# Patient Record
Sex: MALE | Race: WHITE | Employment: FULL TIME | URBAN - METROPOLITAN AREA
[De-identification: names, ages, dates, MRNs, and addresses within clinical notes are randomized per-mention and may not be internally consistent; named-entity substitution may affect disease eponyms.]

---
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
## 2020-01-05 LAB
HSV 1 GLYCOPROTEIN G AB, IGG: 27.3 IV
HSV 2 GLYCOPROTEIN G AB, IGG: 0.13 IV
HSV TYPE 1/2 COMBINED AB, IGG: >22.4 IV
HSV TYPE 1/2 COMBINED ABS, IGM: 0.99 IV

## 2020-01-20 ENCOUNTER — HOSPITAL ENCOUNTER (OUTPATIENT)
Age: 20
Discharge: HOME | End: 2020-01-20
Attending: EMERGENCY MEDICINE
Payer: COMMERCIAL

## 2020-01-20 VITALS
BODY MASS INDEX: 21.7 KG/M2 | HEART RATE: 69 BPM | RESPIRATION RATE: 18 BRPM | DIASTOLIC BLOOD PRESSURE: 75 MMHG | HEIGHT: 71 IN | TEMPERATURE: 98 F | OXYGEN SATURATION: 100 % | WEIGHT: 155 LBS | SYSTOLIC BLOOD PRESSURE: 122 MMHG

## 2020-01-20 DIAGNOSIS — S76.211A GROIN STRAIN, RIGHT, INITIAL ENCOUNTER: Primary | ICD-10-CM

## 2020-01-20 PROCEDURE — 99212 OFFICE O/P EST SF 10 MIN: CPT

## 2020-01-20 PROCEDURE — 99202 OFFICE O/P NEW SF 15 MIN: CPT

## 2020-01-20 NOTE — ED INITIAL ASSESSMENT (HPI)
Patient is here after lifting a patient that weighted approximately 250 pounds on Wednesday while at work. He has been having right lower abdominal pain since then. He states the pain is getting better but work wants him to have it checked out.

## 2020-01-20 NOTE — ED PROVIDER NOTES
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 Appearance: He is well-developed. He is not diaphoretic. HENT:      Head: Normocephalic and atraumatic.    Eyes:      Conjunctiva/sclera: Conjunctivae normal.      Pupils: Pupils are equal, appointment as soon as possible for a visit in 2 days  Call for next available appointment        Medications Prescribed:  There are no discharge medications for this patient.

## 2020-05-06 ENCOUNTER — TELEMEDICINE (OUTPATIENT)
Dept: INTEGRATIVE MEDICINE | Facility: CLINIC | Age: 20
End: 2020-05-06

## 2020-05-06 DIAGNOSIS — Q27.30 AVM (ARTERIOVENOUS MALFORMATION): ICD-10-CM

## 2020-05-06 DIAGNOSIS — M25.512 ACUTE PAIN OF LEFT SHOULDER: ICD-10-CM

## 2020-05-06 DIAGNOSIS — S49.92XA INJURY OF LEFT SHOULDER, INITIAL ENCOUNTER: Primary | ICD-10-CM

## 2020-05-06 DIAGNOSIS — I67.1 CEREBRAL ANEURYSM, NONRUPTURED: ICD-10-CM

## 2020-05-06 DIAGNOSIS — Z92.3 S/P RADIATION THERAPY: ICD-10-CM

## 2020-05-06 PROCEDURE — 99214 OFFICE O/P EST MOD 30 MIN: CPT | Performed by: FAMILY MEDICINE

## 2020-05-06 NOTE — PATIENT INSTRUCTIONS
I have complete kraig in the body's ability to heal and transform. The products and items listed below (the “Products”)  and their claims have not been evaluated by the Food and Drug Administration.  Dietary products are not intended to treat, prevent, m to purchase: www.[employer]  Directions:  apply to affected area twice daily  Why: Traumeel S relieves pain and swelling and reduces inflammation in the case of injuries, minor injuries (eg bruises, hematomas, bruises), trains on the muscles, ligaments and

## 2020-05-06 NOTE — PROGRESS NOTES
Nael Buckley is a 21year old male. 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    Has been seen for PT under w Food insecurity:        Worry: Not on file        Inability: Not on file      Transportation needs:        Medical: Not on file        Non-medical: Not on file    Tobacco Use      Smoking status: Never Smoker      Smokeless tobacco: Never Used    ONEOK Neurological: He is alert and oriented to person, place, and time. No cranial nerve deficit. Gait normal.   Skin: Skin is warm and dry. Psychiatric: Affect normal.       ASSESSMENT AND PLAN:       1.  Injury of left shoulder, initial encounter  - PHYSIATR The products and items listed below (the “Products”)  and their claims have not been evaluated by the Food and Drug Administration. Dietary products are not intended to treat, prevent, mitigate or cure disease.  Ultimately, you must draw your own conclusion Where to purchase: www.[employer]  Directions:  apply to affected area twice daily  Why: Traumeel S relieves pain and swelling and reduces inflammation in the case of injuries, minor injuries (eg bruises, hematomas, bruises), trains on the muscles, ligamen

## 2020-05-21 ENCOUNTER — HOSPITAL ENCOUNTER (OUTPATIENT)
Dept: CT IMAGING | Facility: HOSPITAL | Age: 20
Discharge: HOME | End: 2020-05-21
Attending: FAMILY MEDICINE
Payer: COMMERCIAL

## 2020-05-21 ENCOUNTER — HOSPITAL ENCOUNTER (OUTPATIENT)
Dept: MRI IMAGING | Facility: HOSPITAL | Age: 20
Discharge: HOME | End: 2020-05-21
Attending: FAMILY MEDICINE
Payer: COMMERCIAL

## 2020-05-21 ENCOUNTER — TELEPHONE (OUTPATIENT)
Dept: INTEGRATIVE MEDICINE | Facility: CLINIC | Age: 20
End: 2020-05-21

## 2020-05-21 DIAGNOSIS — Z92.3 S/P RADIATION THERAPY: ICD-10-CM

## 2020-05-21 DIAGNOSIS — S49.92XA INJURY OF LEFT SHOULDER, INITIAL ENCOUNTER: ICD-10-CM

## 2020-05-21 DIAGNOSIS — Q28.2: ICD-10-CM

## 2020-05-21 DIAGNOSIS — M25.512 ACUTE PAIN OF LEFT SHOULDER: ICD-10-CM

## 2020-05-21 DIAGNOSIS — Q27.30 AVM (ARTERIOVENOUS MALFORMATION): ICD-10-CM

## 2020-05-21 DIAGNOSIS — I67.1 CEREBRAL ANEURYSM, NONRUPTURED: ICD-10-CM

## 2020-05-21 PROCEDURE — 82565 ASSAY OF CREATININE: CPT

## 2020-05-21 PROCEDURE — 73221 MRI JOINT UPR EXTREM W/O DYE: CPT | Performed by: FAMILY MEDICINE

## 2020-05-21 PROCEDURE — 70496 CT ANGIOGRAPHY HEAD: CPT | Performed by: FAMILY MEDICINE

## 2020-05-21 PROCEDURE — 70498 CT ANGIOGRAPHY NECK: CPT | Performed by: FAMILY MEDICINE

## 2020-05-21 NOTE — TELEPHONE ENCOUNTER
EE CT department called saying questioning the order you have for patient.  Please call at [phone]

## 2020-07-16 ENCOUNTER — OFFICE VISIT (OUTPATIENT)
Dept: INTEGRATIVE MEDICINE | Facility: CLINIC | Age: 20
End: 2020-07-16

## 2020-07-16 VITALS
BODY MASS INDEX: 23.66 KG/M2 | SYSTOLIC BLOOD PRESSURE: 120 MMHG | WEIGHT: 169 LBS | HEART RATE: 78 BPM | DIASTOLIC BLOOD PRESSURE: 72 MMHG | TEMPERATURE: 98 F | HEIGHT: 71 IN

## 2020-07-16 DIAGNOSIS — S46.912S STRAIN OF LEFT SHOULDER, SEQUELA: Primary | ICD-10-CM

## 2020-07-16 PROCEDURE — 99213 OFFICE O/P EST LOW 20 MIN: CPT | Performed by: PHYSICIAN ASSISTANT

## 2020-07-16 NOTE — PROGRESS NOTES
Juventino Nelson is a 21year old male. Patient presents with: Follow - Up: return to work note and exam       HPI:   Patient presents for follow up on shoulder energy which occurred 3/20.  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 He exhibits no mass. There is no tenderness. There is no guarding. Musculoskeletal: Normal range of motion. General: No tenderness or edema. Lymphadenopathy:     He has no cervical adenopathy.    Neurological: He is alert and

## 2020-08-18 ENCOUNTER — TELEPHONE (OUTPATIENT)
Dept: INTEGRATIVE MEDICINE | Facility: CLINIC | Age: 20
End: 2020-08-18

## 2020-08-18 DIAGNOSIS — M26.12 JAW ASYMMETRY: Primary | ICD-10-CM

## 2020-08-18 NOTE — TELEPHONE ENCOUNTER
Father called and is asking for a recommendation for an oral surgeon close to [facility]. Would like Dr Radha Fallon recommendation.  Pt informed that Dr Yimi Kitchen is out of the office this week and message would be sent to [facility] [place]

## 2020-08-24 NOTE — TELEPHONE ENCOUNTER
Referral signed, however it doesn't look like the oral surgeons are covered under the insurance plan, so I don't think he actually needs a referral.

## 2020-08-24 NOTE — TELEPHONE ENCOUNTER
Pts dad asking for referral and not just recommendation for oral surgeon, RN has pended recommendation

## 2020-08-25 NOTE — TELEPHONE ENCOUNTER
LVM informing patient's father that the referral has been placed and is at the  for pick or give us a call back to see if he wants it to be faxed.

## 2020-08-26 ENCOUNTER — TELEPHONE (OUTPATIENT)
Dept: INTEGRATIVE MEDICINE | Facility: CLINIC | Age: 20
End: 2020-08-26

## 2020-08-26 DIAGNOSIS — M26.12 JAW ASYMMETRY: Primary | ICD-10-CM

## 2020-08-26 NOTE — TELEPHONE ENCOUNTER
RN has advised patient that oral surgeons are not covered typically under his insurance policy, and referral dept has reached out to say that they will try to re submit with an in network oral surgeon, RN has pended in network oral surgeon but there are no

## 2020-08-26 NOTE — TELEPHONE ENCOUNTER
I signed the order, however how do you know they are in network? This physician wasn't listed as in-network or anywhere in the list of physicians.

## 2020-08-27 ENCOUNTER — TELEPHONE (OUTPATIENT)
Dept: INTEGRATIVE MEDICINE | Facility: CLINIC | Age: 20
End: 2020-08-27

## 2020-10-15 ENCOUNTER — OFFICE VISIT (OUTPATIENT)
Dept: INTEGRATIVE MEDICINE | Facility: CLINIC | Age: 20
End: 2020-10-15

## 2020-10-15 ENCOUNTER — HOSPITAL ENCOUNTER (EMERGENCY)
Facility: HOSPITAL | Age: 20
Discharge: HOME | End: 2020-10-15
Attending: EMERGENCY MEDICINE
Payer: COMMERCIAL

## 2020-10-15 VITALS
OXYGEN SATURATION: 98 % | HEART RATE: 70 BPM | BODY MASS INDEX: 23.41 KG/M2 | HEIGHT: 71 IN | DIASTOLIC BLOOD PRESSURE: 68 MMHG | SYSTOLIC BLOOD PRESSURE: 134 MMHG | WEIGHT: 167.19 LBS

## 2020-10-15 VITALS
OXYGEN SATURATION: 98 % | SYSTOLIC BLOOD PRESSURE: 114 MMHG | DIASTOLIC BLOOD PRESSURE: 63 MMHG | TEMPERATURE: 98 F | WEIGHT: 167 LBS | HEART RATE: 56 BPM | BODY MASS INDEX: 23.38 KG/M2 | HEIGHT: 71 IN | RESPIRATION RATE: 18 BRPM

## 2020-10-15 DIAGNOSIS — S76.111A STRAIN OF RIGHT QUADRICEPS, INITIAL ENCOUNTER: ICD-10-CM

## 2020-10-15 DIAGNOSIS — I67.1 CEREBRAL ANEURYSM, NONRUPTURED: ICD-10-CM

## 2020-10-15 DIAGNOSIS — R55 SYNCOPE, NEAR: Primary | ICD-10-CM

## 2020-10-15 DIAGNOSIS — R55 PRE-SYNCOPE: Primary | ICD-10-CM

## 2020-10-15 PROCEDURE — 85025 COMPLETE CBC W/AUTO DIFF WBC: CPT | Performed by: EMERGENCY MEDICINE

## 2020-10-15 PROCEDURE — 85379 FIBRIN DEGRADATION QUANT: CPT | Performed by: EMERGENCY MEDICINE

## 2020-10-15 PROCEDURE — 99214 OFFICE O/P EST MOD 30 MIN: CPT | Performed by: FAMILY MEDICINE

## 2020-10-15 PROCEDURE — 93010 ELECTROCARDIOGRAM REPORT: CPT | Performed by: EMERGENCY MEDICINE

## 2020-10-15 PROCEDURE — 96360 HYDRATION IV INFUSION INIT: CPT

## 2020-10-15 PROCEDURE — 80048 BASIC METABOLIC PNL TOTAL CA: CPT | Performed by: EMERGENCY MEDICINE

## 2020-10-15 PROCEDURE — 3078F DIAST BP <80 MM HG: CPT | Performed by: FAMILY MEDICINE

## 2020-10-15 PROCEDURE — 99284 EMERGENCY DEPT VISIT MOD MDM: CPT

## 2020-10-15 PROCEDURE — 93005 ELECTROCARDIOGRAM TRACING: CPT

## 2020-10-15 PROCEDURE — 3075F SYST BP GE 130 - 139MM HG: CPT | Performed by: FAMILY MEDICINE

## 2020-10-15 PROCEDURE — 3008F BODY MASS INDEX DOCD: CPT | Performed by: FAMILY MEDICINE

## 2020-10-15 PROCEDURE — 84484 ASSAY OF TROPONIN QUANT: CPT | Performed by: EMERGENCY MEDICINE

## 2020-10-15 NOTE — PROGRESS NOTES
Zachariah Parker is a 21year old male. Patient presents with:   Injury: R thigh - was playing soccer, felt a pop - x1 week  Syncope: hx of brain anyesum (age 9-12 yrs old) - states that he feels like hes going to pass out but then comes back to focus - no Not on file    Social Needs      Financial resource strain: Not on file      Food insecurity        Worry: Not on file        Inability: Not on file      Transportation needs        Medical: Not on file        Non-medical: Not on file    Tobacco Use Conjunctivae and EOM are normal. No scleral icterus. Neck: Neck supple. No thyromegaly present. Cardiovascular: Normal rate and regular rhythm.    Pulmonary/Chest: Effort normal and breath sounds normal.   Musculoskeletal:         General: No deformity

## 2020-10-15 NOTE — ED PROVIDER NOTES
Patient Seen in: [facility] AND [facility] Emergency Department      History   Patient presents with:  Lightheadedness    Stated Complaint: lightheaded    HPI    59-year-old male with history of brain aneurysm, last imaging in March is normal per patient, here w HPI.  Constitutional and vital signs reviewed. All other systems reviewed and negative except as noted above.     Physical Exam     ED Triage Vitals [10/15/20 1738]   /89   Pulse 67   Resp 18   Temp 98.1 °F (36.7 °C)   Temp src Oral   SpO2 99 % Result Value Ref Range    Glucose 95 70 - 99 mg/dL    Sodium 139 136 - 145 mmol/L    Potassium 3.9 3.5 - 5.1 mmol/L    Chloride 105 98 - 112 mmol/L    CO2 30.0 21.0 - 32.0 mmol/L    Anion Gap 4 0 - 18 mmol/L    BUN 17 7 - 18 mg/dL    Creatinine 0.96 0.70 and found no leukocytosis, no anemia, electrolytes reassuring, troponin negative, dimer negative  - fluids ordered  - decreased episodes of lightheadedness - pt well appearing - discussed need for possible outpt holter monitoring  - no indication for brain this patient.

## 2020-10-15 NOTE — ED INITIAL ASSESSMENT (HPI)
Patient presents to ER from [address] [place] [address] with complaints of episodes of the sensation of passing out, light headedness throughout the day. Patient admits they are happening more frequently. Patient also admits to history of brain aneuysm at age 15.    Leyla

## 2020-10-16 ENCOUNTER — TELEPHONE (OUTPATIENT)
Dept: INTEGRATIVE MEDICINE | Facility: CLINIC | Age: 20
End: 2020-10-16

## 2020-10-16 NOTE — TELEPHONE ENCOUNTER
Patient went to ER  (felt like he was passing out) ER  inform him that his  doctor should  monitor his heart. # [phone]      FYSWAPNA, Patient is scheduled for appt on 10/20/20.

## 2020-10-22 ENCOUNTER — OFFICE VISIT (OUTPATIENT)
Dept: INTEGRATIVE MEDICINE | Facility: CLINIC | Age: 20
End: 2020-10-22

## 2020-10-22 VITALS
OXYGEN SATURATION: 98 % | DIASTOLIC BLOOD PRESSURE: 62 MMHG | SYSTOLIC BLOOD PRESSURE: 116 MMHG | HEART RATE: 58 BPM | WEIGHT: 165.38 LBS | BODY MASS INDEX: 23.15 KG/M2 | HEIGHT: 71 IN

## 2020-10-22 DIAGNOSIS — I67.1 CEREBRAL ANEURYSM, NONRUPTURED: ICD-10-CM

## 2020-10-22 DIAGNOSIS — Q27.30 AVM (ARTERIOVENOUS MALFORMATION): ICD-10-CM

## 2020-10-22 DIAGNOSIS — S76.111D STRAIN OF RIGHT QUADRICEPS, SUBSEQUENT ENCOUNTER: ICD-10-CM

## 2020-10-22 DIAGNOSIS — Q28.2: ICD-10-CM

## 2020-10-22 DIAGNOSIS — R55 PRE-SYNCOPE: Primary | ICD-10-CM

## 2020-10-22 PROCEDURE — 3074F SYST BP LT 130 MM HG: CPT | Performed by: FAMILY MEDICINE

## 2020-10-22 PROCEDURE — 90686 IIV4 VACC NO PRSV 0.5 ML IM: CPT | Performed by: FAMILY MEDICINE

## 2020-10-22 PROCEDURE — 99214 OFFICE O/P EST MOD 30 MIN: CPT | Performed by: FAMILY MEDICINE

## 2020-10-22 PROCEDURE — 3078F DIAST BP <80 MM HG: CPT | Performed by: FAMILY MEDICINE

## 2020-10-22 PROCEDURE — 90471 IMMUNIZATION ADMIN: CPT | Performed by: FAMILY MEDICINE

## 2020-10-22 PROCEDURE — 3008F BODY MASS INDEX DOCD: CPT | Performed by: FAMILY MEDICINE

## 2020-11-04 ENCOUNTER — HOSPITAL ENCOUNTER (OUTPATIENT)
Dept: CV DIAGNOSTICS | Facility: HOSPITAL | Age: 20
Discharge: HOME | End: 2020-11-04
Attending: FAMILY MEDICINE
Payer: COMMERCIAL

## 2020-11-04 ENCOUNTER — HOSPITAL ENCOUNTER (OUTPATIENT)
Dept: CT IMAGING | Facility: HOSPITAL | Age: 20
Discharge: HOME | End: 2020-11-04
Attending: FAMILY MEDICINE
Payer: COMMERCIAL

## 2020-11-04 DIAGNOSIS — I67.1 CEREBRAL ANEURYSM, NONRUPTURED: ICD-10-CM

## 2020-11-04 DIAGNOSIS — Q28.2: ICD-10-CM

## 2020-11-04 DIAGNOSIS — R55 PRE-SYNCOPE: ICD-10-CM

## 2020-11-04 PROCEDURE — 70496 CT ANGIOGRAPHY HEAD: CPT | Performed by: FAMILY MEDICINE

## 2020-11-04 PROCEDURE — 93306 TTE W/DOPPLER COMPLETE: CPT | Performed by: FAMILY MEDICINE

## 2020-11-04 PROCEDURE — 70498 CT ANGIOGRAPHY NECK: CPT | Performed by: FAMILY MEDICINE

## 2020-11-10 ENCOUNTER — TELEPHONE (OUTPATIENT)
Dept: INTEGRATIVE MEDICINE | Facility: CLINIC | Age: 20
End: 2020-11-10

## 2020-11-10 DIAGNOSIS — M26.12 OTHER JAW ASYMMETRY: Primary | ICD-10-CM

## 2020-11-10 NOTE — TELEPHONE ENCOUNTER
Please fax referral for facial surgeon [facility]#[phone]  - at the [address] [place] [address] office now.

## 2020-11-10 NOTE — TELEPHONE ENCOUNTER
RN called the office of Dr Carlos Garner back, and was hung on on and called back was placed on hold and needed to handle triage. Pt was to be evaluated by PCP before referral could be processed.

## 2020-11-10 NOTE — TELEPHONE ENCOUNTER
There is no active referral in patient's chart.  It was cancelled in late August, as patient was to f/u with provider first.

## 2020-11-11 NOTE — TELEPHONE ENCOUNTER
[facility] message sent to patient informing him to follow up with Dr Rafiq Kuhn for further recommendations on oral surgery or referral.

## 2020-11-12 NOTE — TELEPHONE ENCOUNTER
Pt informed that referral has been placed - also scheduled appt for 12/15/20 to be evaluated for his concern regarding oral surgery

## 2020-11-12 NOTE — TELEPHONE ENCOUNTER
Referral for Dr. Carole Brownlee placed, however the HMO may require that he be seen in the office for evaluation of his concern prior to approval of the referral.

## 2020-12-19 ENCOUNTER — IMMUNIZATION (OUTPATIENT)
Dept: LAB | Facility: HOSPITAL | Age: 20
End: 2020-12-19
Attending: PREVENTIVE MEDICINE
Payer: COMMERCIAL

## 2020-12-19 DIAGNOSIS — Z23 NEED FOR VACCINATION: ICD-10-CM

## 2020-12-19 PROCEDURE — 0001A PFIZER-BIONTECH COVID-19 VACCINE: CPT

## 2021-01-09 ENCOUNTER — IMMUNIZATION (OUTPATIENT)
Dept: LAB | Facility: HOSPITAL | Age: 21
End: 2021-01-09
Attending: PREVENTIVE MEDICINE
Payer: COMMERCIAL

## 2021-01-09 DIAGNOSIS — Z23 NEED FOR VACCINATION: ICD-10-CM

## 2021-01-09 PROCEDURE — 0002A SARSCOV2 VAC 30MCG/0.3ML IM: CPT

## 2021-12-21 ENCOUNTER — HOSPITAL ENCOUNTER (OUTPATIENT)
Age: 21
Discharge: HOME | End: 2021-12-21
Payer: COMMERCIAL

## 2021-12-21 VITALS
HEART RATE: 65 BPM | TEMPERATURE: 97 F | SYSTOLIC BLOOD PRESSURE: 140 MMHG | DIASTOLIC BLOOD PRESSURE: 86 MMHG | OXYGEN SATURATION: 100 % | RESPIRATION RATE: 18 BRPM

## 2021-12-21 DIAGNOSIS — J02.9 ACUTE PHARYNGITIS, UNSPECIFIED ETIOLOGY: Primary | ICD-10-CM

## 2021-12-21 PROCEDURE — U0002 COVID-19 LAB TEST NON-CDC: HCPCS | Performed by: NURSE PRACTITIONER

## 2021-12-21 PROCEDURE — 99213 OFFICE O/P EST LOW 20 MIN: CPT | Performed by: NURSE PRACTITIONER

## 2021-12-21 PROCEDURE — 87880 STREP A ASSAY W/OPTIC: CPT | Performed by: NURSE PRACTITIONER

## 2021-12-21 RX ORDER — BENZONATATE 100 MG/1
100 CAPSULE ORAL 3 TIMES DAILY PRN
Qty: 20 CAPSULE | Refills: 0 | Status: SHIPPED | OUTPATIENT
Start: 2021-12-21 | End: 2021-12-28

## 2021-12-22 NOTE — ED PROVIDER NOTES
Patient Seen in: [facility]    History   CC: cough  HPI: Bianca Swannv 24year old male  who presents c/o cough, runny nose, congestion, sore throat and chills.   States he has had a cough for the last 1 week however in the last 24 hours symp injected, no discharge noted, no periorbital edema  ENT - EAC bilaterally without discharge, TM pearly grey with COL visualized appropriately bilaterally. no nasal drainage noted in nares bilat, no cobblestoning to post. Pharynx.    Oropharynx clear, post

## 2021-12-25 ENCOUNTER — HOSPITAL ENCOUNTER (OUTPATIENT)
Age: 21
Discharge: ANOTHER HOSPITAL | End: 2021-12-25
Attending: EMERGENCY MEDICINE
Payer: COMMERCIAL

## 2021-12-25 ENCOUNTER — APPOINTMENT (OUTPATIENT)
Dept: CT IMAGING | Facility: HOSPITAL | Age: 21
End: 2021-12-25
Attending: EMERGENCY MEDICINE
Payer: COMMERCIAL

## 2021-12-25 ENCOUNTER — HOSPITAL ENCOUNTER (EMERGENCY)
Facility: HOSPITAL | Age: 21
Discharge: HOME | End: 2021-12-25
Attending: EMERGENCY MEDICINE
Payer: COMMERCIAL

## 2021-12-25 VITALS
DIASTOLIC BLOOD PRESSURE: 90 MMHG | TEMPERATURE: 98 F | BODY MASS INDEX: 25.9 KG/M2 | HEART RATE: 69 BPM | HEIGHT: 71 IN | WEIGHT: 185 LBS | SYSTOLIC BLOOD PRESSURE: 141 MMHG | OXYGEN SATURATION: 100 % | RESPIRATION RATE: 14 BRPM

## 2021-12-25 VITALS
RESPIRATION RATE: 16 BRPM | SYSTOLIC BLOOD PRESSURE: 135 MMHG | WEIGHT: 185 LBS | HEART RATE: 76 BPM | DIASTOLIC BLOOD PRESSURE: 64 MMHG | BODY MASS INDEX: 25.9 KG/M2 | TEMPERATURE: 99 F | HEIGHT: 71 IN | OXYGEN SATURATION: 100 %

## 2021-12-25 DIAGNOSIS — R42 DIZZINESS: ICD-10-CM

## 2021-12-25 DIAGNOSIS — R51.9 NONINTRACTABLE EPISODIC HEADACHE, UNSPECIFIED HEADACHE TYPE: Primary | ICD-10-CM

## 2021-12-25 DIAGNOSIS — R55 SYNCOPE, NEAR: Primary | ICD-10-CM

## 2021-12-25 PROCEDURE — 93005 ELECTROCARDIOGRAM TRACING: CPT

## 2021-12-25 PROCEDURE — 93010 ELECTROCARDIOGRAM REPORT: CPT | Performed by: EMERGENCY MEDICINE

## 2021-12-25 PROCEDURE — 70496 CT ANGIOGRAPHY HEAD: CPT | Performed by: EMERGENCY MEDICINE

## 2021-12-25 PROCEDURE — 70498 CT ANGIOGRAPHY NECK: CPT | Performed by: EMERGENCY MEDICINE

## 2021-12-25 PROCEDURE — 99285 EMERGENCY DEPT VISIT HI MDM: CPT

## 2021-12-25 PROCEDURE — 85025 COMPLETE CBC W/AUTO DIFF WBC: CPT | Performed by: EMERGENCY MEDICINE

## 2021-12-25 PROCEDURE — 80048 BASIC METABOLIC PNL TOTAL CA: CPT | Performed by: EMERGENCY MEDICINE

## 2021-12-25 PROCEDURE — 36415 COLL VENOUS BLD VENIPUNCTURE: CPT

## 2021-12-25 NOTE — ED INITIAL ASSESSMENT (HPI)
Pt c/ head pulsating, lightheaded feeling, dizziness, vision changes, near syncopal episode lasting approximately 10 secs since this am.  States happening 20 mins to 1 hr.  States happened Oct 2020 and seen at [facility] ER dx'd with near syncope.    Hx of br

## 2021-12-25 NOTE — ED PROVIDER NOTES
Patient Seen in: [facility]      History   Patient presents with:   Other    Stated Complaint: Fainting    Subjective:   HPI    Describes episodes of a throbbing sensation in the right side of the head associated with a feeling like he is goi Normal range of motion and neck supple. Skin:     General: Skin is warm and dry. Capillary Refill: Capillary refill takes less than 2 seconds. Neurological:      General: No focal deficit present.       Mental Status: He is alert and oriented to pe

## 2021-12-25 NOTE — ED PROVIDER NOTES
Patient Seen in: [facility] AND [facility] Emergency Department      History   Patient presents with:   Other    Stated Complaint: near syncope     Subjective:   HPI  Patient is a 20-year-old male history of brain aneurysm status post radiation therapy, recent UR General: He is not in acute distress. Appearance: Normal appearance. He is not toxic-appearing. HENT:      Head: Normocephalic.       Mouth/Throat:      Mouth: Mucous membranes are moist.   Eyes:      Extraocular Movements: Extraocular movements intac intervals and axes as noted on EKG Report.   Rate: 75  Rhythm: Sinus Rhythm  Reading: Normal sinus rhythm, normal axis, normal intervals, no ST or T wave changes                       MDM      Patient is a 27-year-old male history of brain aneurysm status p

## 2021-12-25 NOTE — ED INITIAL ASSESSMENT (HPI)
Pt arrived to ED from [address] c/o approximately 10 near syncopal episodes today, pt denies falling or hitting head. Pt reports cough, denies shortness of breath.

## 2021-12-26 NOTE — ED QUICK NOTES
Patient safe to DC home per MD. Dominick Nunez to dress self. DC teaching done, instructions reviewed with patient including when and how to follow up with healthcare providers and when to seek emergency care. The patient verbalizes understanding.  Peripheral IV disc

## 2021-12-26 NOTE — ED PROVIDER NOTES
Signed out by previous shift to follow-up results of CTA of the head and neck. Results are overall unremarkable. Discussed with patient he is comfortable with discharge at this time. CTA BRAIN (SZT=[id number])    Result Date: 12/25/2021  CONCLUSION:   1.  Corey Galindo

## 2022-01-02 ENCOUNTER — HOSPITAL ENCOUNTER (EMERGENCY)
Facility: HOSPITAL | Age: 22
Discharge: STILL ADMITTED OR ADMINISTRATIVE | End: 2022-01-02
Payer: COMMERCIAL

## 2022-01-19 ENCOUNTER — OFFICE VISIT (OUTPATIENT)
Dept: NEUROLOGY | Facility: CLINIC | Age: 22
End: 2022-01-19
Payer: COMMERCIAL

## 2022-01-19 VITALS
WEIGHT: 185 LBS | BODY MASS INDEX: 25.9 KG/M2 | HEIGHT: 71 IN | SYSTOLIC BLOOD PRESSURE: 108 MMHG | DIASTOLIC BLOOD PRESSURE: 66 MMHG | HEART RATE: 62 BPM

## 2022-01-19 DIAGNOSIS — Q28.3 VEIN OF GALEN BRAIN MALFORMATION: Primary | ICD-10-CM

## 2022-01-19 PROCEDURE — 3078F DIAST BP <80 MM HG: CPT | Performed by: OTHER

## 2022-01-19 PROCEDURE — 3074F SYST BP LT 130 MM HG: CPT | Performed by: OTHER

## 2022-01-19 PROCEDURE — 3008F BODY MASS INDEX DOCD: CPT | Performed by: OTHER

## 2022-01-19 PROCEDURE — 99205 OFFICE O/P NEW HI 60 MIN: CPT | Performed by: OTHER

## 2022-01-19 RX ORDER — LORAZEPAM 1 MG/1
1 TABLET ORAL
Qty: 1 TABLET | Refills: 0 | Status: SHIPPED | OUTPATIENT
Start: 2022-01-19 | End: 2022-01-19

## 2022-01-19 NOTE — PATIENT INSTRUCTIONS
1. Please get the mri of your brain  2. Please call [facility] and follow up with them to see if you need a cerebral angiogram.  3. Please get the EEG  4.  Call/text if you become symptomatic again

## 2022-01-19 NOTE — PROGRESS NOTES
[facility]  [facility] [facility], [address] [place] [address]  [phone]          [place] [address]  NEUROLOGY VISIT FOR HEADACHE  Reason for Admission/Consultation: pulsations in his head in the setting of  Radiation significant sx on xmas day; lasted all day and persisted for the next week. The period between episodes was very short. He had 1 every 30 minute to an hour. could be as short as q 20 minutes. 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    Josie Lackey summation of prior records  1. ROS:  Pertinent positive and negatives per HPI. All others were reviewed and negative.     Past Medical History:   Diagnosis Date   • Brain aneurysm    • Exposure to medical diagnostic radiation 2010    brain aneurysm flattening of hypothenar eminences.        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

## 2022-01-22 ENCOUNTER — TELEPHONE (OUTPATIENT)
Dept: INTERNAL MEDICINE CLINIC | Facility: HOSPITAL | Age: 22
End: 2022-01-22

## 2022-01-22 ENCOUNTER — HOSPITAL ENCOUNTER (EMERGENCY)
Facility: HOSPITAL | Age: 22
Discharge: HOME | End: 2022-01-22
Payer: COMMERCIAL

## 2022-01-22 DIAGNOSIS — Z20.822 EXPOSURE TO COVID-19 VIRUS: Primary | ICD-10-CM

## 2022-01-22 DIAGNOSIS — Z20.822 SUSPECTED COVID-19 VIRUS INFECTION: ICD-10-CM

## 2022-01-22 LAB — SARS-COV-2 RNA RESP QL NAA+PROBE: NOT DETECTED

## 2022-01-22 NOTE — TELEPHONE ENCOUNTER
Outside Covid Testing done    Results and RTW guidelines:    COVID RESULT reported:      Test type:    [x] Rapid outside         [] PCR outside       [] Home Test    Date of test: 1/22/22    Test location: [facility]        [] Result viewed in Epic with v work  [] Follow up with PCP  [] Home until further instruction from hotline with [facility] results  INSTRUCTIONS PROVIDED:  [x]  Plan as noted above  []  Length of time to obtain results  [x]  May return to work if views negative in My Chart and  remains fev someone on your unit while not wearing a mask? (e.g., during meal breaks):  Yes []   No [x]    If yes, who:   Do you share a workspace? Yes []   No [x]       If yes, with whom? Do you have any family members sick at home?      [] Yes    [] No   If yes, ex

## 2022-01-24 ENCOUNTER — HOSPITAL ENCOUNTER (OUTPATIENT)
Age: 22
Discharge: HOME | End: 2022-01-24
Payer: COMMERCIAL

## 2022-01-24 VITALS
DIASTOLIC BLOOD PRESSURE: 69 MMHG | HEART RATE: 80 BPM | SYSTOLIC BLOOD PRESSURE: 136 MMHG | TEMPERATURE: 98 F | OXYGEN SATURATION: 100 % | RESPIRATION RATE: 18 BRPM

## 2022-01-24 DIAGNOSIS — J02.0 STREPTOCOCCAL SORE THROAT: Primary | ICD-10-CM

## 2022-01-24 LAB
S PYO AG THROAT QL: POSITIVE
SARS-COV-2 RNA RESP QL NAA+PROBE: NOT DETECTED

## 2022-01-24 PROCEDURE — U0002 COVID-19 LAB TEST NON-CDC: HCPCS | Performed by: NURSE PRACTITIONER

## 2022-01-24 PROCEDURE — 87880 STREP A ASSAY W/OPTIC: CPT | Performed by: NURSE PRACTITIONER

## 2022-01-24 PROCEDURE — 99213 OFFICE O/P EST LOW 20 MIN: CPT | Performed by: NURSE PRACTITIONER

## 2022-01-24 RX ORDER — AMOXICILLIN 875 MG/1
875 TABLET, COATED ORAL 2 TIMES DAILY
Qty: 20 TABLET | Refills: 0 | Status: SHIPPED | OUTPATIENT
Start: 2022-01-24 | End: 2022-02-03

## 2022-01-24 NOTE — ED PROVIDER NOTES
----- Message from SHE Jaffe sent at 6/10/2020 12:04 PM CDT -----  Please notify the patient of the abnormal labs.   CCP is normal, C reactive protein is normal, lipid panel is good.  Sedimentation rate is slightly elevated, rheumatoid factor is elevated.  Will refer to rheumatology for further evaluation, diagnosis and care.  Electrolytes are normal, mild decrease in kidney function most likely related to fasting status.  Make sure she is drinking at least 64 oz of water a day.  Blood counts are normal.  Repeat CBC, CMP, and lipid in 1 year.   Patient presents with:  Sore Throat  Cough/URI  Testing      HPI:     Romi Mahmood is a 25year old male who presents for sore throat and dry cough for the past couple days. Positive exposure to Covid. He is fully vaccinated.   No difficulty swallowing Health  Financial Resource Strain: Not on file  Food Insecurity: Not on file  Transportation Needs: Not on file  Physical Activity: Not on file  Stress: Not on file  Social Connections: Not on file  Intimate Partner Violence: Not on file  Housing Stability supportive care and follow-up as needed with his doctor. Diagnosis:    ICD-10-CM    1. Streptococcal sore throat  J02.0        All results reviewed and discussed with patient. See AVS for detailed discharge instructions for your condition today.     Jeremiah Habermann

## 2022-01-26 NOTE — TELEPHONE ENCOUNTER
Outside Covid Testing done  [facility] test to confirm positive home test  Results and RTW guidelines:    COVID RESULT reported:      Test type:    [] Rapid outside         [] PCR outside       [x] Home Test    Date of test:   Test location:1/26/2022    [] Re Has consistent cough, shortness of breath or fatigue that restricts your physical activities    2. Is still feeling \"unwell\"    3. Within 15 days of hospitalization for COVID    4. Within 20 days of intubation for COVID    5.  Still has a fever, vomiting Gustavo Hernandez  Position:  [] MD     [] RN     [] Respiratory Therapist     [x] PCT     [] PSR      [] BHA     [] MA [] Other     If non-employed enter email address:     HAVE YOU RECEIVED THE COVID-19 Vaccine?  Yes [x]    No []          If yes, date(s) received: (e.g., during meal breaks):  Yes []   No [x]    If yes, who:   Do you share a workspace? Yes [x]   No []       If yes, with whom? Co workers  Do you have any family members sick at home?      [] Yes    [] No   If yes, explain:       NOTES: (narrative docume

## 2022-01-26 NOTE — TELEPHONE ENCOUNTER
Patient went to [address] [place] [address] for evaluation on 1/24/22. Negative rapid covid test. Will have follow up with PCP as needed.

## 2022-01-27 ENCOUNTER — NURSE ONLY (OUTPATIENT)
Dept: LAB | Age: 22
End: 2022-01-27
Attending: PREVENTIVE MEDICINE
Payer: COMMERCIAL

## 2022-01-27 DIAGNOSIS — Z20.822 SUSPECTED COVID-19 VIRUS INFECTION: ICD-10-CM

## 2022-01-28 LAB — SARS-COV-2 RNA RESP QL NAA+PROBE: DETECTED

## 2022-01-28 NOTE — TELEPHONE ENCOUNTER
Results and RTW guidelines:    COVID RESULT:    [x] Viewed by employee in [address] RTW plan and instructions as indicated on triage call. Manager notified. Estimated RTW date:  1/31/22  [x] Discussed with employee   [] Unable to reach by phone.   Sent off work minimum of 5 days from positive test or onset of symptoms (day 0)        On day 5, if asymptomatic or mildly symptomatic (with improving symptoms) may return to work day 6          On day 5, if symptomatic, call Employee Health for RTW screening

## 2022-02-15 ENCOUNTER — HOSPITAL ENCOUNTER (OUTPATIENT)
Dept: MRI IMAGING | Facility: HOSPITAL | Age: 22
Discharge: HOME | End: 2022-02-15
Attending: Other
Payer: COMMERCIAL

## 2022-02-15 DIAGNOSIS — Q28.3 VEIN OF GALEN BRAIN MALFORMATION: ICD-10-CM

## 2022-02-15 PROCEDURE — A9575 INJ GADOTERATE MEGLUMI 0.1ML: HCPCS | Performed by: OTHER

## 2022-02-15 PROCEDURE — 70553 MRI BRAIN STEM W/O & W/DYE: CPT | Performed by: OTHER

## 2022-02-23 ENCOUNTER — HOSPITAL ENCOUNTER (OUTPATIENT)
Dept: ELECTROPHYSIOLOGY | Facility: HOSPITAL | Age: 22
Discharge: HOME | End: 2022-02-23
Attending: Other
Payer: COMMERCIAL

## 2022-02-23 PROCEDURE — 95816 EEG AWAKE AND DROWSY: CPT | Performed by: OTHER

## 2022-02-23 NOTE — PROCEDURES
EEG report    REFERRING PHYSICIAN: Deidre Ferraro DO    PCP and phone number:  Mitul Antony DO  [phone]    TECHNIQUE: 21 channels of EEG, 2 channels of EOG, and 1 channel of EKG were recorded utilizing the International 10/20 System. The recording was performed in a digitized monopolar referential format and playback was reformatted into various referential and bipolar montages utilizing appropriate filter settings. Automatic seizure and spike detection programs were utilized throughout the recording. Video was recorded during the study    CLINICAL DATA:  Patient is sent for the evaluation of possible seizures. MEDICATION:  No current outpatient medications on file. ACTIVATION:  Hyperventilation: Done no abnormalities noted  Photic stimulation: Done, no abnormalities noted  Sleep: Normal sleep architecture was seen. BACKGROUND  While the patient was awake, the posterior dominant rhythm consisted of well-regulated 9-10 Hz rhythmic waveforms, symmetrically distributed over both posterior quadrants and was reactive to eye opening. EEG ABNORMALITY  None    IMPRESSION:  This is a normal EEG. No focal, lateralized, or epileptiform features are noted. Clinical correlation required.

## 2022-07-10 ENCOUNTER — IMMUNIZATION (OUTPATIENT)
Dept: LAB | Age: 22
End: 2022-07-10
Attending: EMERGENCY MEDICINE

## 2022-07-10 DIAGNOSIS — Z23 NEED FOR VACCINATION: Primary | ICD-10-CM

## 2022-07-10 PROCEDURE — 0054A SARSCOV2 VAC 30MCG TRS SUCR: CPT

## 2022-09-09 ENCOUNTER — NURSE ONLY (OUTPATIENT)
Dept: INTERNAL MEDICINE CLINIC | Facility: HOSPITAL | Age: 22
End: 2022-09-09
Attending: EMERGENCY MEDICINE

## 2022-09-09 DIAGNOSIS — Z00.00 WELLNESS EXAMINATION: Primary | ICD-10-CM

## 2022-09-09 PROCEDURE — 86480 TB TEST CELL IMMUN MEASURE: CPT

## 2022-09-12 LAB
M TB IFN-G CD4+ T-CELLS BLD-ACNC: 0.02 IU/ML
M TB TUBERC IFN-G BLD QL: NEGATIVE
M TB TUBERC IGNF/MITOGEN IGNF CONTROL: >10 IU/ML
QFT TB1 AG MINUS NIL: 0 IU/ML
QFT TB2 AG MINUS NIL: 0.01 IU/ML

## 2022-12-09 ENCOUNTER — OFFICE VISIT (OUTPATIENT)
Dept: NEUROLOGY | Facility: CLINIC | Age: 22
End: 2022-12-09
Payer: COMMERCIAL

## 2022-12-09 ENCOUNTER — HOSPITAL ENCOUNTER (OUTPATIENT)
Dept: CT IMAGING | Facility: HOSPITAL | Age: 22
Discharge: HOME | End: 2022-12-09
Attending: Other
Payer: COMMERCIAL

## 2022-12-09 ENCOUNTER — HOSPITAL ENCOUNTER (OUTPATIENT)
Dept: ELECTROPHYSIOLOGY | Facility: HOSPITAL | Age: 22
Discharge: HOME | End: 2022-12-09
Attending: Other
Payer: COMMERCIAL

## 2022-12-09 ENCOUNTER — MOBILE ENCOUNTER (OUTPATIENT)
Dept: NEUROLOGY | Facility: CLINIC | Age: 22
End: 2022-12-09

## 2022-12-09 VITALS
DIASTOLIC BLOOD PRESSURE: 60 MMHG | SYSTOLIC BLOOD PRESSURE: 110 MMHG | HEIGHT: 71 IN | WEIGHT: 181 LBS | BODY MASS INDEX: 25.34 KG/M2 | HEART RATE: 68 BPM

## 2022-12-09 DIAGNOSIS — R94.01 ABNORMAL EEG: ICD-10-CM

## 2022-12-09 DIAGNOSIS — R29.818 TRANSIENT NEUROLOGICAL SYMPTOMS: Primary | ICD-10-CM

## 2022-12-09 DIAGNOSIS — R29.818 TRANSIENT NEUROLOGICAL SYMPTOMS: ICD-10-CM

## 2022-12-09 DIAGNOSIS — Q28.2: ICD-10-CM

## 2022-12-09 PROBLEM — G40.89 OTHER SEIZURES (HCC): Status: ACTIVE | Noted: 2022-12-09

## 2022-12-09 LAB
CREAT BLD-MCNC: 0.9 MG/DL
GFR SERPLBLD BASED ON 1.73 SQ M-ARVRAT: 124 ML/MIN/1.73M2 (ref 60–?)

## 2022-12-09 PROCEDURE — 70496 CT ANGIOGRAPHY HEAD: CPT | Performed by: OTHER

## 2022-12-09 PROCEDURE — 3008F BODY MASS INDEX DOCD: CPT | Performed by: OTHER

## 2022-12-09 PROCEDURE — 3074F SYST BP LT 130 MM HG: CPT | Performed by: OTHER

## 2022-12-09 PROCEDURE — 95816 EEG AWAKE AND DROWSY: CPT | Performed by: OTHER

## 2022-12-09 PROCEDURE — 99214 OFFICE O/P EST MOD 30 MIN: CPT | Performed by: OTHER

## 2022-12-09 PROCEDURE — 3078F DIAST BP <80 MM HG: CPT | Performed by: OTHER

## 2022-12-09 PROCEDURE — 82565 ASSAY OF CREATININE: CPT

## 2022-12-09 RX ORDER — LEVETIRACETAM 500 MG/1
500 TABLET ORAL 2 TIMES DAILY
Qty: 180 TABLET | Refills: 1 | Status: SHIPPED | OUTPATIENT
Start: 2022-12-09 | End: 2023-06-07

## 2022-12-09 NOTE — PROGRESS NOTES
Spoke w [facility] on call;  Recommend the patient get an MRV, whether without contrast, MRA with them without contrast, an MRI brain with without contrast. This will help visualize arterialization of his veins/interval, avm information. Patient should have the MRI rather than the CTV. Will also have an follow up with either [facility] at [facility] or [facility] [address] at [facility].

## 2022-12-09 NOTE — PROCEDURES
EEG report    REFERRING PHYSICIAN: Mary Jane Carbajal DO    PCP and phone number:  No primary care provider on file. None    TECHNIQUE: 21 channels of EEG, 2 channels of EOG, and 1 channel of EKG were recorded utilizing the International 10/20 System. The recording was performed in a digitized monopolar referential format and playback was reformatted into various referential and bipolar montages utilizing appropriate filter settings. Automatic seizure and spike detection programs were utilized throughout the recording. Video was recorded during the study    CLINICAL DATA:  Patient is sent for the evaluation of possible seizures. MEDICATION:  No current outpatient medications on file. ACTIVATION:  Hyperventilation: Done no abnormalities noted  Photic stimulation: Done, no abnormalities noted  Sleep: Normal sleep architecture was seen. BACKGROUND  While the patient was awake, the posterior dominant rhythm consisted of well-regulated 9-10 Hz rhythmic waveforms, symmetrically distributed over both posterior quadrants and was reactive to eye opening. EEG ABNORMALITY  During drowsiness patient had rare frontal central sharp wave slow wave complexes with equipotentiality at F3-C3     IMPRESSION:  This is an abnormal EEG. Left frontal central epileptiform features are noted, that could be suggestive of epileptogenic focus in the left central frontal region. Clinical correlation required.

## 2022-12-10 ENCOUNTER — HOSPITAL ENCOUNTER (OUTPATIENT)
Dept: MRI IMAGING | Facility: HOSPITAL | Age: 22
Discharge: HOME | End: 2022-12-10
Attending: Other
Payer: COMMERCIAL

## 2022-12-10 DIAGNOSIS — R29.818 TRANSIENT NEUROLOGICAL SYMPTOMS: ICD-10-CM

## 2022-12-10 DIAGNOSIS — Q28.2: ICD-10-CM

## 2022-12-10 PROCEDURE — 70546 MR ANGIOGRAPH HEAD W/O&W/DYE: CPT | Performed by: OTHER

## 2022-12-10 PROCEDURE — 70553 MRI BRAIN STEM W/O & W/DYE: CPT | Performed by: OTHER

## 2022-12-10 PROCEDURE — 70544 MR ANGIOGRAPHY HEAD W/O DYE: CPT | Performed by: OTHER

## 2022-12-10 PROCEDURE — A9575 INJ GADOTERATE MEGLUMI 0.1ML: HCPCS | Performed by: OTHER

## 2022-12-10 RX ORDER — GADOTERATE MEGLUMINE 376.9 MG/ML
15 INJECTION INTRAVENOUS
Status: COMPLETED | OUTPATIENT
Start: 2022-12-10 | End: 2022-12-10

## 2022-12-10 RX ADMIN — GADOTERATE MEGLUMINE 15 ML: 376.9 INJECTION INTRAVENOUS at 11:10:00

## 2022-12-12 ENCOUNTER — MOBILE ENCOUNTER (OUTPATIENT)
Dept: NEUROLOGY | Facility: CLINIC | Age: 22
End: 2022-12-12

## 2022-12-12 ENCOUNTER — TELEPHONE (OUTPATIENT)
Dept: NEUROLOGY | Facility: CLINIC | Age: 22
End: 2022-12-12

## 2022-12-12 DIAGNOSIS — R94.01 ABNORMAL EEG: Primary | ICD-10-CM

## 2022-12-12 NOTE — TELEPHONE ENCOUNTER
MRI BRAIN & MRV HEAD (W+WO) completed 12/10/22. Referral order for [facility] placed 12/9/22. Per note below, pt needs interventional appointment next week with Dr. Nuzhat Brown. Called patient. No answer. Left message to call back.      Upon return call, pt can be given Dr Theresa Romano office # [phone]

## 2022-12-12 NOTE — TELEPHONE ENCOUNTER
Franko1 Asael HERNANDEZ, spoke to [place]. Dr. Candelario Moon is not seeing any patient's at this time. Scheduled patient with Dr. Martha Cuba on Monday, 12/19 at 10 am.     Spoke to patient and notified him of appointment. He was understanding and very thankful for the return call.

## 2022-12-12 NOTE — TELEPHONE ENCOUNTER
Patient called to say that he called the Office of  Dr. Joesph Hunter and they told him Doctor was not currently taking on new Patients as he is involved in a \"Study\" currently. Please advise.

## 2022-12-16 ENCOUNTER — HOSPITAL ENCOUNTER (OUTPATIENT)
Dept: ELECTROPHYSIOLOGY | Facility: HOSPITAL | Age: 22
Discharge: HOME | End: 2022-12-16
Attending: Other
Payer: COMMERCIAL

## 2022-12-16 PROCEDURE — 95719 EEG PHYS/QHP EA INCR W/O VID: CPT | Performed by: OTHER

## 2022-12-19 ENCOUNTER — OFFICE VISIT (OUTPATIENT)
Dept: SURGERY | Facility: CLINIC | Age: 22
End: 2022-12-19
Payer: COMMERCIAL

## 2022-12-19 DIAGNOSIS — Q28.3 VEIN OF GALEN MALFORMATION: Primary | ICD-10-CM

## 2022-12-19 PROCEDURE — 99204 OFFICE O/P NEW MOD 45 MIN: CPT | Performed by: NEUROLOGICAL SURGERY

## 2022-12-19 RX ORDER — PENICILLIN V POTASSIUM 500 MG/1
500 TABLET ORAL 2 TIMES DAILY
COMMUNITY
Start: 2022-11-14

## 2023-09-18 ENCOUNTER — OFFICE VISIT (OUTPATIENT)
Dept: INTERNAL MEDICINE CLINIC | Facility: CLINIC | Age: 23
End: 2023-09-18

## 2023-09-18 ENCOUNTER — LAB ENCOUNTER (OUTPATIENT)
Dept: LAB | Age: 23
End: 2023-09-18
Attending: INTERNAL MEDICINE
Payer: COMMERCIAL

## 2023-09-18 VITALS
WEIGHT: 167 LBS | SYSTOLIC BLOOD PRESSURE: 122 MMHG | BODY MASS INDEX: 23.38 KG/M2 | HEIGHT: 71 IN | HEART RATE: 73 BPM | DIASTOLIC BLOOD PRESSURE: 68 MMHG | OXYGEN SATURATION: 98 %

## 2023-09-18 DIAGNOSIS — Z13.0 SCREENING FOR DEFICIENCY ANEMIA: ICD-10-CM

## 2023-09-18 DIAGNOSIS — Z13.220 LIPID SCREENING: ICD-10-CM

## 2023-09-18 DIAGNOSIS — Z12.83 SKIN CANCER SCREENING: ICD-10-CM

## 2023-09-18 DIAGNOSIS — Z13.21 ENCOUNTER FOR VITAMIN DEFICIENCY SCREENING: ICD-10-CM

## 2023-09-18 DIAGNOSIS — R73.09 ELEVATED GLUCOSE: ICD-10-CM

## 2023-09-18 DIAGNOSIS — Z13.29 THYROID DISORDER SCREEN: ICD-10-CM

## 2023-09-18 DIAGNOSIS — Z23 NEED FOR DIPHTHERIA-TETANUS-PERTUSSIS (TDAP) VACCINE: ICD-10-CM

## 2023-09-18 DIAGNOSIS — E55.9 VITAMIN D DEFICIENCY: ICD-10-CM

## 2023-09-18 DIAGNOSIS — Z00.00 WELLNESS EXAMINATION: Primary | ICD-10-CM

## 2023-09-18 DIAGNOSIS — Z00.00 WELLNESS EXAMINATION: ICD-10-CM

## 2023-09-18 LAB
ALBUMIN SERPL-MCNC: 4.1 G/DL (ref 3.4–5)
ALBUMIN/GLOB SERPL: 1.2 {RATIO} (ref 1–2)
ALP LIVER SERPL-CCNC: 66 U/L
ALT SERPL-CCNC: 24 U/L
ANION GAP SERPL CALC-SCNC: 8 MMOL/L (ref 0–18)
AST SERPL-CCNC: 22 U/L (ref 15–37)
BASOPHILS # BLD AUTO: 0.01 X10(3) UL (ref 0–0.2)
BASOPHILS NFR BLD AUTO: 0.2 %
BILIRUB SERPL-MCNC: 0.4 MG/DL (ref 0.1–2)
BUN BLD-MCNC: 14 MG/DL (ref 7–18)
BUN/CREAT SERPL: 15.1 (ref 10–20)
CALCIUM BLD-MCNC: 9.4 MG/DL (ref 8.5–10.1)
CHLORIDE SERPL-SCNC: 108 MMOL/L (ref 98–112)
CHOLEST SERPL-MCNC: 149 MG/DL (ref ?–200)
CO2 SERPL-SCNC: 28 MMOL/L (ref 21–32)
CREAT BLD-MCNC: 0.93 MG/DL
DEPRECATED RDW RBC AUTO: 42.2 FL (ref 35.1–46.3)
EGFRCR SERPLBLD CKD-EPI 2021: 118 ML/MIN/1.73M2 (ref 60–?)
EOSINOPHIL # BLD AUTO: 0.21 X10(3) UL (ref 0–0.7)
EOSINOPHIL NFR BLD AUTO: 4.3 %
ERYTHROCYTE [DISTWIDTH] IN BLOOD BY AUTOMATED COUNT: 13.2 % (ref 11–15)
EST. AVERAGE GLUCOSE BLD GHB EST-MCNC: 103 MG/DL (ref 68–126)
FASTING PATIENT LIPID ANSWER: YES
FASTING STATUS PATIENT QL REPORTED: YES
GLOBULIN PLAS-MCNC: 3.3 G/DL (ref 2.8–4.4)
GLUCOSE BLD-MCNC: 89 MG/DL (ref 70–99)
HBA1C MFR BLD: 5.2 % (ref ?–5.7)
HBV SURFACE AB SER QL: REACTIVE
HBV SURFACE AB SERPL IA-ACNC: >1000 MIU/ML
HCT VFR BLD AUTO: 46.7 %
HDLC SERPL-MCNC: 56 MG/DL (ref 40–59)
HGB BLD-MCNC: 15.1 G/DL
IMM GRANULOCYTES # BLD AUTO: 0.01 X10(3) UL (ref 0–1)
IMM GRANULOCYTES NFR BLD: 0.2 %
LDLC SERPL CALC-MCNC: 81 MG/DL (ref ?–100)
LYMPHOCYTES # BLD AUTO: 1.54 X10(3) UL (ref 1–4)
LYMPHOCYTES NFR BLD AUTO: 31.8 %
MCH RBC QN AUTO: 28.2 PG (ref 26–34)
MCHC RBC AUTO-ENTMCNC: 32.3 G/DL (ref 31–37)
MCV RBC AUTO: 87.1 FL
MONOCYTES # BLD AUTO: 0.31 X10(3) UL (ref 0.1–1)
MONOCYTES NFR BLD AUTO: 6.4 %
NEUTROPHILS # BLD AUTO: 2.77 X10 (3) UL (ref 1.5–7.7)
NEUTROPHILS # BLD AUTO: 2.77 X10(3) UL (ref 1.5–7.7)
NEUTROPHILS NFR BLD AUTO: 57.1 %
NONHDLC SERPL-MCNC: 93 MG/DL (ref ?–130)
OSMOLALITY SERPL CALC.SUM OF ELEC: 298 MOSM/KG (ref 275–295)
PLATELET # BLD AUTO: 188 10(3)UL (ref 150–450)
POTASSIUM SERPL-SCNC: 4.2 MMOL/L (ref 3.5–5.1)
PROT SERPL-MCNC: 7.4 G/DL (ref 6.4–8.2)
RBC # BLD AUTO: 5.36 X10(6)UL
RUBV IGG SER QL: POSITIVE
RUBV IGG SER-ACNC: 413.8 IU/ML (ref 10–?)
SODIUM SERPL-SCNC: 144 MMOL/L (ref 136–145)
TRIGL SERPL-MCNC: 55 MG/DL (ref 30–149)
TSI SER-ACNC: 0.85 MIU/ML (ref 0.36–3.74)
VIT D+METAB SERPL-MCNC: 32.4 NG/ML (ref 30–100)
VLDLC SERPL CALC-MCNC: 9 MG/DL (ref 0–30)
WBC # BLD AUTO: 4.9 X10(3) UL (ref 4–11)

## 2023-09-18 PROCEDURE — 36415 COLL VENOUS BLD VENIPUNCTURE: CPT

## 2023-09-18 PROCEDURE — 84443 ASSAY THYROID STIM HORMONE: CPT

## 2023-09-18 PROCEDURE — 3074F SYST BP LT 130 MM HG: CPT | Performed by: INTERNAL MEDICINE

## 2023-09-18 PROCEDURE — 90715 TDAP VACCINE 7 YRS/> IM: CPT | Performed by: INTERNAL MEDICINE

## 2023-09-18 PROCEDURE — 83036 HEMOGLOBIN GLYCOSYLATED A1C: CPT

## 2023-09-18 PROCEDURE — 82306 VITAMIN D 25 HYDROXY: CPT

## 2023-09-18 PROCEDURE — 80053 COMPREHEN METABOLIC PANEL: CPT

## 2023-09-18 PROCEDURE — 86762 RUBELLA ANTIBODY: CPT

## 2023-09-18 PROCEDURE — 86480 TB TEST CELL IMMUN MEASURE: CPT

## 2023-09-18 PROCEDURE — 86735 MUMPS ANTIBODY: CPT

## 2023-09-18 PROCEDURE — 86765 RUBEOLA ANTIBODY: CPT

## 2023-09-18 PROCEDURE — 86706 HEP B SURFACE ANTIBODY: CPT

## 2023-09-18 PROCEDURE — 99385 PREV VISIT NEW AGE 18-39: CPT | Performed by: INTERNAL MEDICINE

## 2023-09-18 PROCEDURE — 3078F DIAST BP <80 MM HG: CPT | Performed by: INTERNAL MEDICINE

## 2023-09-18 PROCEDURE — 3008F BODY MASS INDEX DOCD: CPT | Performed by: INTERNAL MEDICINE

## 2023-09-18 PROCEDURE — 80061 LIPID PANEL: CPT

## 2023-09-18 PROCEDURE — 86787 VARICELLA-ZOSTER ANTIBODY: CPT

## 2023-09-18 PROCEDURE — 85025 COMPLETE CBC W/AUTO DIFF WBC: CPT

## 2023-09-18 PROCEDURE — 90471 IMMUNIZATION ADMIN: CPT | Performed by: INTERNAL MEDICINE

## 2023-09-20 LAB
M TB IFN-G CD4+ T-CELLS BLD-ACNC: 0.03 IU/ML
M TB TUBERC IFN-G BLD QL: NEGATIVE
M TB TUBERC IGNF/MITOGEN IGNF CONTROL: >10 IU/ML
MEV IGG SER-ACNC: 20.6 AU/ML (ref 16.5–?)
MUV IGG SER IA-ACNC: 82.4 AU/ML (ref 11–?)
QFT TB1 AG MINUS NIL: 0.03 IU/ML
QFT TB2 AG MINUS NIL: 0.04 IU/ML
VZV IGG SER IA-ACNC: 1019 (ref 165–?)

## 2023-09-25 ENCOUNTER — NURSE ONLY (OUTPATIENT)
Dept: INTERNAL MEDICINE CLINIC | Facility: CLINIC | Age: 23
End: 2023-09-25

## 2023-09-25 ENCOUNTER — MED REC SCAN ONLY (OUTPATIENT)
Dept: INTERNAL MEDICINE CLINIC | Facility: CLINIC | Age: 23
End: 2023-09-25

## 2023-09-25 ENCOUNTER — APPOINTMENT (OUTPATIENT)
Dept: OTHER | Facility: HOSPITAL | Age: 23
End: 2023-09-25
Attending: EMERGENCY MEDICINE

## 2023-09-25 DIAGNOSIS — Z23 NEED FOR VACCINATION: Primary | ICD-10-CM

## 2023-09-25 PROCEDURE — 90686 IIV4 VACC NO PRSV 0.5 ML IM: CPT | Performed by: INTERNAL MEDICINE

## 2023-09-25 PROCEDURE — 90471 IMMUNIZATION ADMIN: CPT | Performed by: INTERNAL MEDICINE

## 2023-09-25 NOTE — PROGRESS NOTES
Patient presents for scheduled [place] appt. Name and date of birth verified by patient. Verbal order given by Dr. Marcella Ybarra. Flu vaccine administered in left deltoid. Patient tolerated injection well with no adverse reactions. Physical forms completed by Dr. Marcella Ybarra, given to patient. Patient advised to call office with any questions.

## 2024-07-24 ENCOUNTER — OFFICE VISIT (OUTPATIENT)
Dept: INTERNAL MEDICINE CLINIC | Facility: CLINIC | Age: 24
End: 2024-07-24
Payer: COMMERCIAL

## 2024-07-24 VITALS
HEART RATE: 78 BPM | OXYGEN SATURATION: 100 % | HEIGHT: 71 IN | WEIGHT: 173 LBS | SYSTOLIC BLOOD PRESSURE: 131 MMHG | TEMPERATURE: 98 F | DIASTOLIC BLOOD PRESSURE: 70 MMHG | BODY MASS INDEX: 24.22 KG/M2 | RESPIRATION RATE: 17 BRPM

## 2024-07-24 DIAGNOSIS — Z76.89 ENCOUNTER TO ESTABLISH CARE: Primary | ICD-10-CM

## 2024-07-24 DIAGNOSIS — Z02.0 SCHOOL HEALTH EXAMINATION: ICD-10-CM

## 2024-07-24 PROCEDURE — 3078F DIAST BP <80 MM HG: CPT | Performed by: INTERNAL MEDICINE

## 2024-07-24 PROCEDURE — 3075F SYST BP GE 130 - 139MM HG: CPT | Performed by: INTERNAL MEDICINE

## 2024-07-24 PROCEDURE — 3008F BODY MASS INDEX DOCD: CPT | Performed by: INTERNAL MEDICINE

## 2024-07-24 PROCEDURE — 99395 PREV VISIT EST AGE 18-39: CPT | Performed by: INTERNAL MEDICINE

## 2024-07-25 ENCOUNTER — LAB ENCOUNTER (OUTPATIENT)
Dept: LAB | Facility: HOSPITAL | Age: 24
End: 2024-07-25
Attending: INTERNAL MEDICINE
Payer: COMMERCIAL

## 2024-07-25 DIAGNOSIS — Z02.0 SCHOOL HEALTH EXAMINATION: ICD-10-CM

## 2024-07-25 PROCEDURE — 36415 COLL VENOUS BLD VENIPUNCTURE: CPT

## 2024-07-25 PROCEDURE — 86480 TB TEST CELL IMMUN MEASURE: CPT

## 2024-07-27 LAB
M TB IFN-G CD4+ T-CELLS BLD-ACNC: 0 IU/ML
M TB TUBERC IFN-G BLD QL: NEGATIVE
M TB TUBERC IGNF/MITOGEN IGNF CONTROL: >10 IU/ML
QFT TB1 AG MINUS NIL: 0 IU/ML
QFT TB2 AG MINUS NIL: 0 IU/ML

## 2024-07-30 NOTE — PROGRESS NOTES
Subjective:     Patient ID: Mauricio Adair is a 24 year old male.    HPI    Pt comes in for the 1-st time to establish care and needs school physical with Quantiferone gold     History/Other:   Review of Systems   Constitutional: Negative.  Negative for fatigue and fever.   HENT: Negative.  Negative for congestion.    Eyes: Negative.    Respiratory: Negative.  Negative for cough, shortness of breath and wheezing.    Cardiovascular: Negative.  Negative for chest pain, palpitations and leg swelling.   Gastrointestinal: Negative.    Endocrine: Negative for cold intolerance and heat intolerance.   Genitourinary: Negative.  Negative for dysuria, flank pain and hematuria.   Musculoskeletal: Negative.  Negative for arthralgias, back pain and myalgias.   Skin: Negative.    Neurological: Negative.  Negative for dizziness, tremors, syncope, weakness and headaches.   Psychiatric/Behavioral: Negative.  Negative for agitation, behavioral problems and suicidal ideas. The patient is not nervous/anxious.      Current Outpatient Medications   Medication Sig Dispense Refill    penicillin v potassium 500 MG Oral Tab Take 500 mg by mouth 2 (two) times daily. (Patient not taking: Reported on 9/18/2023)       Allergies:No Known Allergies    Past Medical History:    Exposure to medical diagnostic radiation    brain aneurysm    Vein of Liborio malformation (HCC)      Past Surgical History:   Procedure Laterality Date    Appendectomy  2007    Appendectomy      Brain surgery  2010    radiation for brain aneurysm      Family History   Problem Relation Age of Onset    Migraines Mother     ADHD Sister       Social History:   Social History     Socioeconomic History    Marital status: Single   Tobacco Use    Smoking status: Every Day    Smokeless tobacco: Never    Tobacco comments:     Vape   Vaping Use    Vaping status: Every Day   Substance and Sexual Activity    Alcohol use: Yes     Alcohol/week: 3.0 standard drinks of alcohol     Types:  3 Cans of beer per week     Comment: occ    Drug use: Never   Other Topics Concern    Caffeine Concern Yes     Comment: 1-2 daily    Exercise Yes   Social History Narrative    Going to [email or web address]    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   Constitutional:       Appearance: He is well-developed.   HENT:      Head: Normocephalic and atraumatic.      Right Ear: External ear normal.      Left Ear: External ear normal.      Nose: Nose normal.   Eyes:      Conjunctiva/sclera: Conjunctivae normal.      Pupils: Pupils are equal, round, and reactive to light.   Cardiovascular:      Rate and Rhythm: Normal rate and regular rhythm.      Heart sounds: Normal heart sounds.   Pulmonary:      Effort: Pulmonary effort is normal.      Breath sounds: Normal breath sounds.   Abdominal:      General: Bowel sounds are normal.      Palpations: Abdomen is soft.   Genitourinary:     Penis: Normal.       Prostate: Normal.      Rectum: Normal.   Musculoskeletal:         General: Normal range of motion.      Cervical back: Normal range of motion and neck supple.   Skin:     General: Skin is warm and dry.   Neurological:      Mental Status: He is alert and oriented to person, place, and time.      Deep Tendon Reflexes: Reflexes are normal and symmetric.         Assessment & Plan:   1. Encounter to establish care - pt will come in for annual exam   2. School health examination - exam ok, will order quantiferone gold       Orders Placed This Encounter   Procedures    Quantiferon TB Gold (in Tube)       Meds This Visit:  Requested Prescriptions      No prescriptions requested or ordered in this encounter       Imaging & Referrals:  None

## 2024-09-25 ENCOUNTER — IMMUNIZATION (OUTPATIENT)
Dept: LAB | Age: 24
End: 2024-09-25
Attending: INTERNAL MEDICINE
Payer: COMMERCIAL

## 2024-09-25 ENCOUNTER — OFFICE VISIT (OUTPATIENT)
Dept: INTERNAL MEDICINE CLINIC | Facility: CLINIC | Age: 24
End: 2024-09-25
Payer: COMMERCIAL

## 2024-09-25 ENCOUNTER — LAB ENCOUNTER (OUTPATIENT)
Dept: LAB | Age: 24
End: 2024-09-25
Attending: INTERNAL MEDICINE
Payer: COMMERCIAL

## 2024-09-25 VITALS
TEMPERATURE: 98 F | RESPIRATION RATE: 17 BRPM | HEART RATE: 70 BPM | HEIGHT: 71 IN | SYSTOLIC BLOOD PRESSURE: 126 MMHG | WEIGHT: 176 LBS | BODY MASS INDEX: 24.64 KG/M2 | OXYGEN SATURATION: 98 % | DIASTOLIC BLOOD PRESSURE: 67 MMHG

## 2024-09-25 DIAGNOSIS — R29.818 TRANSIENT NEUROLOGICAL SYMPTOMS: ICD-10-CM

## 2024-09-25 DIAGNOSIS — Z00.00 ANNUAL PHYSICAL EXAM: ICD-10-CM

## 2024-09-25 DIAGNOSIS — Z00.00 ANNUAL PHYSICAL EXAM: Primary | ICD-10-CM

## 2024-09-25 DIAGNOSIS — Z23 NEED FOR VACCINATION: Primary | ICD-10-CM

## 2024-09-25 DIAGNOSIS — I67.1 CEREBRAL ANEURYSM, NONRUPTURED (HCC): ICD-10-CM

## 2024-09-25 LAB
ALBUMIN SERPL-MCNC: 4.8 G/DL (ref 3.2–4.8)
ALBUMIN/GLOB SERPL: 1.9 {RATIO} (ref 1–2)
ALP LIVER SERPL-CCNC: 62 U/L
ALT SERPL-CCNC: 10 U/L
ANION GAP SERPL CALC-SCNC: 6 MMOL/L (ref 0–18)
AST SERPL-CCNC: 16 U/L (ref ?–34)
BASOPHILS # BLD AUTO: 0.02 X10(3) UL (ref 0–0.2)
BASOPHILS NFR BLD AUTO: 0.4 %
BILIRUB SERPL-MCNC: 0.6 MG/DL (ref 0.3–1.2)
BUN BLD-MCNC: 15 MG/DL (ref 9–23)
BUN/CREAT SERPL: 17.4 (ref 10–20)
CALCIUM BLD-MCNC: 10.2 MG/DL (ref 8.7–10.4)
CHLORIDE SERPL-SCNC: 108 MMOL/L (ref 98–112)
CHOLEST SERPL-MCNC: 161 MG/DL (ref ?–200)
CO2 SERPL-SCNC: 27 MMOL/L (ref 21–32)
CREAT BLD-MCNC: 0.86 MG/DL
DEPRECATED RDW RBC AUTO: 38.9 FL (ref 35.1–46.3)
EGFRCR SERPLBLD CKD-EPI 2021: 124 ML/MIN/1.73M2 (ref 60–?)
EOSINOPHIL # BLD AUTO: 0.2 X10(3) UL (ref 0–0.7)
EOSINOPHIL NFR BLD AUTO: 4 %
ERYTHROCYTE [DISTWIDTH] IN BLOOD BY AUTOMATED COUNT: 12.6 % (ref 11–15)
FASTING PATIENT LIPID ANSWER: YES
FASTING STATUS PATIENT QL REPORTED: YES
GLOBULIN PLAS-MCNC: 2.5 G/DL (ref 2–3.5)
GLUCOSE BLD-MCNC: 89 MG/DL (ref 70–99)
HCT VFR BLD AUTO: 43.9 %
HDLC SERPL-MCNC: 48 MG/DL (ref 40–59)
HGB BLD-MCNC: 15.4 G/DL
IMM GRANULOCYTES # BLD AUTO: 0.01 X10(3) UL (ref 0–1)
IMM GRANULOCYTES NFR BLD: 0.2 %
LDLC SERPL CALC-MCNC: 100 MG/DL (ref ?–100)
LYMPHOCYTES # BLD AUTO: 1.57 X10(3) UL (ref 1–4)
LYMPHOCYTES NFR BLD AUTO: 31.8 %
MCH RBC QN AUTO: 29.3 PG (ref 26–34)
MCHC RBC AUTO-ENTMCNC: 35.1 G/DL (ref 31–37)
MCV RBC AUTO: 83.6 FL
MONOCYTES # BLD AUTO: 0.39 X10(3) UL (ref 0.1–1)
MONOCYTES NFR BLD AUTO: 7.9 %
NEUTROPHILS # BLD AUTO: 2.75 X10 (3) UL (ref 1.5–7.7)
NEUTROPHILS # BLD AUTO: 2.75 X10(3) UL (ref 1.5–7.7)
NEUTROPHILS NFR BLD AUTO: 55.7 %
NONHDLC SERPL-MCNC: 113 MG/DL (ref ?–130)
OSMOLALITY SERPL CALC.SUM OF ELEC: 292 MOSM/KG (ref 275–295)
PLATELET # BLD AUTO: 170 10(3)UL (ref 150–450)
POTASSIUM SERPL-SCNC: 3.6 MMOL/L (ref 3.5–5.1)
PROT SERPL-MCNC: 7.3 G/DL (ref 5.7–8.2)
RBC # BLD AUTO: 5.25 X10(6)UL
SODIUM SERPL-SCNC: 141 MMOL/L (ref 136–145)
TRIGL SERPL-MCNC: 67 MG/DL (ref 30–149)
TSI SER-ACNC: 1.35 MIU/ML (ref 0.55–4.78)
VLDLC SERPL CALC-MCNC: 11 MG/DL (ref 0–30)
WBC # BLD AUTO: 4.9 X10(3) UL (ref 4–11)

## 2024-09-25 PROCEDURE — 90656 IIV3 VACC NO PRSV 0.5 ML IM: CPT | Performed by: INTERNAL MEDICINE

## 2024-09-25 PROCEDURE — 3078F DIAST BP <80 MM HG: CPT | Performed by: INTERNAL MEDICINE

## 2024-09-25 PROCEDURE — 90471 IMMUNIZATION ADMIN: CPT | Performed by: INTERNAL MEDICINE

## 2024-09-25 PROCEDURE — 99395 PREV VISIT EST AGE 18-39: CPT | Performed by: INTERNAL MEDICINE

## 2024-09-25 PROCEDURE — 80061 LIPID PANEL: CPT

## 2024-09-25 PROCEDURE — 85025 COMPLETE CBC W/AUTO DIFF WBC: CPT

## 2024-09-25 PROCEDURE — 3008F BODY MASS INDEX DOCD: CPT | Performed by: INTERNAL MEDICINE

## 2024-09-25 PROCEDURE — 80053 COMPREHEN METABOLIC PANEL: CPT

## 2024-09-25 PROCEDURE — 36415 COLL VENOUS BLD VENIPUNCTURE: CPT

## 2024-09-25 PROCEDURE — 3074F SYST BP LT 130 MM HG: CPT | Performed by: INTERNAL MEDICINE

## 2024-09-25 PROCEDURE — 84443 ASSAY THYROID STIM HORMONE: CPT

## 2024-09-25 NOTE — PROGRESS NOTES
Subjective:     Patient ID: Mauricio Adair is a 24 year old male.    HPI  Patient comes in for annual physical overall doing okay denies any complaints he has history of cerebral aneurysm thrombosed few years back he had an episode of transient neurological symptoms was seen by neuro was seen by neurosurgery was told to just monitor now is doing overall good denies any complaints    History/Other:   Review of Systems   Constitutional: Negative.    HENT: Negative.     Eyes: Negative.    Respiratory: Negative.     Cardiovascular: Negative.    Gastrointestinal: Negative.    Genitourinary: Negative.    Musculoskeletal: Negative.    Skin: Negative.    Neurological: Negative.    Psychiatric/Behavioral: Negative.       Current Outpatient Medications   Medication Sig Dispense Refill    penicillin v potassium 500 MG Oral Tab Take 500 mg by mouth 2 (two) times daily. (Patient not taking: Reported on 9/18/2023)       Allergies:No Known Allergies    Past Medical History:    Exposure to medical diagnostic radiation    brain aneurysm    Vein of Liborio malformation (HCC)      Past Surgical History:   Procedure Laterality Date    Appendectomy  2007    Appendectomy      Brain surgery  2010    radiation for brain aneurysm      Family History   Problem Relation Age of Onset    Migraines Mother     ADHD Sister       Social History:   Social History     Socioeconomic History    Marital status: Single   Tobacco Use    Smoking status: Some Days     Passive exposure: Never    Smokeless tobacco: Never    Tobacco comments:     Vape   Vaping Use    Vaping status: Some Days    Substances: Nicotine   Substance and Sexual Activity    Alcohol use: Yes     Alcohol/week: 3.0 standard drinks of alcohol     Types: 3 Cans of beer per week     Comment: occ    Drug use: Never   Other Topics Concern    Caffeine Concern Yes     Comment: 1-2 daily    Exercise Yes   Social History Narrative    Going to [employer]    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   Constitutional:       Appearance: He is well-developed.   HENT:      Head: Normocephalic and atraumatic.      Right Ear: External ear normal.      Left Ear: External ear normal.      Nose: Nose normal.   Eyes:      Conjunctiva/sclera: Conjunctivae normal.      Pupils: Pupils are equal, round, and reactive to light.   Cardiovascular:      Rate and Rhythm: Normal rate and regular rhythm.      Heart sounds: Normal heart sounds.   Pulmonary:      Effort: Pulmonary effort is normal.      Breath sounds: Normal breath sounds.   Abdominal:      General: Bowel sounds are normal.      Palpations: Abdomen is soft.   Genitourinary:     Penis: Normal.       Prostate: Normal.      Rectum: Normal.   Musculoskeletal:         General: Normal range of motion.      Cervical back: Normal range of motion and neck supple.   Skin:     General: Skin is warm and dry.   Neurological:      Mental Status: He is alert and oriented to person, place, and time.      Deep Tendon Reflexes: Reflexes are normal and symmetric.         Assessment & Plan:   1. Annual physical exam exam is okay will order labs   2. Transient neurological symptoms 1 episode few years back seen neuro will refer to will follow back with neurology   3. Cerebral aneurysm, nonruptured (HCC) as above follow-up with neurology       Orders Placed This Encounter   Procedures    CBC With Differential With Platelet    Comp Metabolic Panel (14)    Lipid Panel    TSH W Reflex To Free T4    Urinalysis, Routine       Meds This Visit:  Requested Prescriptions      No prescriptions requested or ordered in this encounter       Imaging & Referrals:  NEURO - INTERNAL

## (undated) NOTE — LETTER
Date: 10/15/2020    Patient Name: Zacharaih Parker  YOB: 2000        To Whom it may concern:     This letter has been written at the patient's request. The above patient was seen at the [facility] for treatment of a medical conditio

## (undated) NOTE — LETTER
01/27/20        Katty 74 [address] 77      Dear Oc Snyder,    [id number] records indicate that you have outstanding lab work and or testing that was ordered for you and has not yet been completed:  Orders Placed This Encounter

## (undated) NOTE — LETTER
22          Pierre Posey  :  2000      To Whom It May Concern: This patient was seen in our office on 22 . Work status:  Remain off work until 2022. Tentatively may return to work on 22. If this office may be of further assistance, please do not hesitate to contact us.       Sincerely,        Thelma Guardado, DO